# Patient Record
Sex: FEMALE | Race: BLACK OR AFRICAN AMERICAN
[De-identification: names, ages, dates, MRNs, and addresses within clinical notes are randomized per-mention and may not be internally consistent; named-entity substitution may affect disease eponyms.]

---

## 2020-09-11 LAB
ANION GAP SERPL CALC-SCNC: 16.7 MMOL/L (ref 8–16)
BUN SERPL-MCNC: 9 MG/DL (ref 7–26)
BUN/CREAT SERPL: 13 (ref 6–25)
CALCIUM SERPL-MCNC: 8.8 MG/DL (ref 8.4–10.2)
CHLORIDE SERPL-SCNC: 103 MMOL/L (ref 98–107)
CO2 SERPL-SCNC: 23 MMOL/L (ref 22–29)
EGFRCR SERPLBLD CKD-EPI 2021: > 60 ML/MIN (ref 60–?)
GLUCOSE SERPLBLD-MCNC: 88 MG/DL (ref 74–118)
POTASSIUM SERPL-SCNC: 3.7 MMOL/L (ref 3.5–5.1)
SODIUM SERPL-SCNC: 139 MMOL/L (ref 136–145)

## 2020-09-17 ENCOUNTER — HOSPITAL ENCOUNTER (OUTPATIENT)
Dept: HOSPITAL 88 - OR | Age: 55
Discharge: HOME | End: 2020-09-17
Attending: SPECIALIST
Payer: COMMERCIAL

## 2020-09-17 VITALS — DIASTOLIC BLOOD PRESSURE: 86 MMHG | SYSTOLIC BLOOD PRESSURE: 139 MMHG

## 2020-09-17 DIAGNOSIS — Z88.0: ICD-10-CM

## 2020-09-17 DIAGNOSIS — J30.2: ICD-10-CM

## 2020-09-17 DIAGNOSIS — M67.52: ICD-10-CM

## 2020-09-17 DIAGNOSIS — S83.222A: Primary | ICD-10-CM

## 2020-09-17 DIAGNOSIS — M94.262: ICD-10-CM

## 2020-09-17 DIAGNOSIS — Z88.8: ICD-10-CM

## 2020-09-17 DIAGNOSIS — G47.33: ICD-10-CM

## 2020-09-17 DIAGNOSIS — R03.0: ICD-10-CM
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
## 2020-09-17 DIAGNOSIS — Z88.1: ICD-10-CM

## 2020-09-17 DIAGNOSIS — Z79.84: ICD-10-CM

## 2020-09-17 DIAGNOSIS — E11.9: ICD-10-CM

## 2020-09-17 DIAGNOSIS — M17.12: ICD-10-CM

## 2020-09-17 PROCEDURE — 80048 BASIC METABOLIC PNL TOTAL CA: CPT

## 2020-09-17 PROCEDURE — 29881 ARTHRS KNE SRG MNISECTMY M/L: CPT

## 2020-09-17 PROCEDURE — 82948 REAGENT STRIP/BLOOD GLUCOSE: CPT

## 2020-09-17 PROCEDURE — 36415 COLL VENOUS BLD VENIPUNCTURE: CPT

## 2020-09-17 PROCEDURE — 93005 ELECTROCARDIOGRAM TRACING: CPT

## 2020-09-22 NOTE — OPERATIVE REPORT
DATE OF PROCEDURE:  09/17/2020

 

SURGEON:  Leonides Hastings MD

 

PREOPERATIVE DIAGNOSES:  Left knee medial meniscus tear, left knee degenerative joint

disease of the knee. 

 

POSTOPERATIVE DIAGNOSES:  Left knee medial meniscus tear, left knee degenerative joint

disease of the knee, left knee symptomatic medial shelf plica. 

 

OPERATIONS AND PROCEDURES PERFORMED:  The patient underwent left knee exam under

anesthesia, left knee arthroscopy, left knee partial medial meniscectomy, left knee

chondroplasty of the medial femoral condyle, medial tibial plateau, and lateral tibial

plateau, and resection of a medial shelf plica. 

 

ASSISTANT:  There was no assistant.

 

ANESTHESIA:  General endotracheal intubation anesthesia.

 

IV FLUIDS:  As per the anesthesia record.

 

BRIEF DESCRIPTION OF THE PATIENT'S OPERATIVE PROCEDURE:  Ms. Barriga was taken to the

operating room and placed in supine position on the operative table.  Following

induction of general anesthesia as well as endotracheal intubation, the patient's left

lower extremity was examined under anesthesia.  She was found to have a mild effusion of

the knee joint, but otherwise ligamentously stable knee.  The patient's lower extremity

was prepped and draped in surgical fashion.  A two-port technique used to provide this

patient arthroscopic evaluation of the knee joint.  Examination of suprapatellar pouch,

medial and lateral gutters found no evidence of loose bodies.  There was, however,

evidence of a large and inflamed medial shelf plica interdigitating between the patella

and trochlea.  The scope was advanced to the medial compartment and examination of the

medical compartment demonstrated chondromalacia of the articulating surface.  There was

also a torn medial meniscus.  A combination of biting forceps and a motorized shaver

used to resect the torn portion of meniscus.  Chondroplasties of the medial femoral

condyle and medial tibial plateau performed at this time.  Scope was then advanced into

the intercondylar notch and the anterior cruciate ligament was identified and found to

be intact.  Scope was advanced to the lateral compartment and chondromalacia of the

lateral tibial plateau was encountered.  A chondroplasty of this surface was performed.

The scope was then placed in suprapatellar pouch and the plica was resected.  The knee

was then deflated with sterile normal saline.  The portal sites were closed using 4-0

nylon suture.  The portal sites as well as knee itself were injected with 0.5% Marcaine

with epinephrine.  Sterile dressings were applied, and the patient was awakened and

taken to Postanesthesia Care Unit in stable condition. 

 

 

 

 

______________________________

MD NAT Martínez/PURNIMA

D:  09/22/2020 10:27:40

T:  09/22/2020 14:03:43

Job #:  [id number] complains of pain/discomfort